# Patient Record
(demographics unavailable — no encounter records)

---

## 2025-01-10 NOTE — PLAN
[Med Options Discussed: _____] : - Medication options discussed [unfilled] [Follow-up visit (med treatment monitoring): ____] : - Follow-up visit in [unfilled]  to evaluate response to medication and monitoring of medication treatment [AE Strategies] : Strategies to reduce side effects from current or proposed medication regimen [Family Questions] : Family's questions were addressed [Sleep] : The importance of sleep and strategies to ensure adequate sleep [Media / Screen Time] : Importance of limiting electronics, media, and screen time [FreeTextEntry3] : - switch methylphenidate from liquid to tablet since she dislikes the taste of methylphenidate oral solution but if does not work as well then mother will switch back to liquid if methylphenidate 20mg is ineffective. Effects and side effect profile of the medication were discussed with parent.  - continue methylphenidate ER 30mg patch in AM to target ADHD  - discussed ways to increase her caloric intake - continue clonidine ER 0.1mg tab- 2tabs at night and hydroxyzine 25mg prior to bedtime both to target insomnia Parent showed understanding and agreed with plan.  [FreeTextEntry1] : Audie Barrientos MD Director, Division of Developmental-Behavioral Pediatrics [facility], [facility] Certified Developmental-Behavioral Pediatrician

## 2025-01-10 NOTE — REASON FOR VISIT
[Follow-Up Visit] : a follow-up visit for [ADHD] : ADHD [Anxiety] : anxiety [Autism Spectrum Disorder] : autism spectrum disorder [Other: ____] : [unfilled] [Mother] : mother [FreeTextEntry3] : 6-28-24

## 2025-07-18 NOTE — PLAN
[Med Options Discussed: _____] : - Medication options discussed [unfilled] [Follow-up visit (med treatment monitoring): ____] : - Follow-up visit in [unfilled]  to evaluate response to medication and monitoring of medication treatment [Family Questions] : Family's questions were addressed [Monitor Attention] : - [unfilled]'s attention skills will need to continue to be monitored

## 2025-07-18 NOTE — REASON FOR VISIT
[Follow-Up Visit] : a follow-up visit for [ADHD] : ADHD [Anxiety] : anxiety [Autism Spectrum Disorder] : autism spectrum disorder [Other: ____] : [unfilled] [Mother] : mother

## 2025-07-18 NOTE — HISTORY OF PRESENT ILLNESS
[SC: _____] : self-contained [unfilled] [IEP] : Individualized Education Program [AU] : Autism [PT:____] : Physical Therapy [unfilled] [S-L: _____] : Speech/Language Therapy [unfilled] [Aide: _____] : Aide or Paraprofessional [unfilled] [12 mos.] : 12 - Month Special Service and/or Program: Yes [Spec. Transportation] : Special Transportation: Yes [Entering in September] : entering in September [No Major Concerns] : No major concerns [No Side Effects] : no side effects